# Patient Record
(demographics unavailable — no encounter records)

---

## 2019-01-31 NOTE — RADIOLOGY REPORT (SQ)
EXAM DESCRIPTION:  LUMBAR PUNCTURE; FLUORO/NEEDLE PLACEMENT/SPINE



COMPLETED DATE/TIME:  1/31/2019 11:08 am



REASON FOR STUDY:  MULTIPLE SCLEROSIS



COMPARISON:  None.



FLUOROSCOPY TIME:  9 seconds

1 digital fluoroscopic images saved to PACS.



TECHNIQUE:  Fluoroscopic guided lumbar puncture.



LIMITATIONS:  None.



PROCEDURE:  After written consent and assessment were obtained, the patient was brought into the fluo
roscopy room and placed prone on the table.  The patient's lower back was prepped in a sterile fashio
n and an entry site was selected under live fluoroscopic guidance, right paracentral L2-3 level. The 
entry site was anesthetized with 4 mL of 1% lidocaine. A 22 gauge needle was advanced through the ski
n and into the thecal sac at the right paracentral L2-3 level After approximately 8.5 ml was drained,
 the needle was removed and a sterile bandage was placed of the site.  Specimens were sent to the lab
 for testing.  A fluoroscopic spot image was saved to PACS confirming level access.



FINDINGS:  Clear CSF, opening pressure 23 cm of water.



IMPRESSION:  Lumbar puncture under fluoroscopy. No immediate complication.



COMMENT:  Patient medication list reviewed: Yes- Quality ID# [id number]:Eligible professional attests to doc
umenting in the medical record they obtained, updated, or reviewed the patient's current medications.
.

Quality :  Final reports for procedures using fluoroscopy that document radiation exposure jose d
mario, or exposure time and number of fluorographic images (if radiation exposure indices are not avail
able)



TECHNICAL DOCUMENTATION:  JOB ID:  [id number]

 2011 [employer]- All Rights Reserved



Reading location - IP/workstation name: [email or web address]

## 2019-01-31 NOTE — RADIOLOGY REPORT (SQ)
EXAM DESCRIPTION:  LUMBAR PUNCTURE; FLUORO/NEEDLE PLACEMENT/SPINE



COMPLETED DATE/TIME:  1/31/2019 11:08 am



REASON FOR STUDY:  MULTIPLE SCLEROSIS



COMPARISON:  None.



FLUOROSCOPY TIME:  9 seconds

1 digital fluoroscopic images saved to PACS.



TECHNIQUE:  Fluoroscopic guided lumbar puncture.



LIMITATIONS:  None.



PROCEDURE:  After written consent and assessment were obtained, the patient was brought into the fluo
roscopy room and placed prone on the table.  The patient's lower back was prepped in a sterile fashio
n and an entry site was selected under live fluoroscopic guidance, right paracentral L2-3 level. The 
entry site was anesthetized with 4 mL of 1% lidocaine. A 22 gauge needle was advanced through the ski
n and into the thecal sac at the right paracentral L2-3 level After approximately 8.5 ml was drained,
 the needle was removed and a sterile bandage was placed of the site.  Specimens were sent to the lab
 for testing.  A fluoroscopic spot image was saved to PACS confirming level access.



FINDINGS:  Clear CSF, opening pressure 23 cm of water.



IMPRESSION:  Lumbar puncture under fluoroscopy. No immediate complication.



COMMENT:  Patient medication list reviewed: Yes- Quality ID# [id number]:Eligible professional attests to doc
umenting in the medical record they obtained, updated, or reviewed the patient's current medications.
.

Quality :  Final reports for procedures using fluoroscopy that document radiation exposure jose d
mario, or exposure time and number of fluorographic images (if radiation exposure indices are not avail
able)



TECHNICAL DOCUMENTATION:  JOB ID:  [id number]

 2011 [employer]- All Rights Reserved



Reading location - IP/workstation name: [email or web address]

## 2019-02-01 NOTE — ER DOCUMENT REPORT
ED General





- General


Chief Complaint: Headache


Stated Complaint: HEADACHE


Time Seen by Provider: 02/01/19 16:39


Primary Care Provider: 


BAHMAN RIVAS MD [Primary Care Provider] - Follow up as needed


Mode of Arrival: Ambulatory


Notes: 





Patient is a 21-year-old female without chronic medical problems, had a 

diagnostic lumbar puncture performed yesterday to evaluate for possible MS.  

States that several hours after receiving lumbar puncture she developed a 

global, throbbing, constant headache that has been ongoing since that time.  

Patient states that the headache is associated with photophobia as well as 

nausea but no vomiting.  She tried her triptan migraine medication at home 

without relief.  States that standing or exerting herself worsens the head.  

Does have history of migraine headaches but states this does not feel like her 

typical migraine headache.  Contacted the clinic that performed a lumbar 

puncture, was instructed to come to the emergency department.  Denies any focal 

weakness, numbness, fever or confusion.  Contrary to triage assessment patient 

denies any significant pain to the lumbar puncture site.


TRAVEL OUTSIDE OF THE [place] IN LAST 30 DAYS: No





- Related Data


Allergies/Adverse Reactions: 


                                        





No Known Allergies Allergy (Unverified 02/01/19 15:56)


   











Past Medical History





- General


Information source: Patient





- Social History


Smoking Status: Current Every Day Smoker


Frequency of alcohol use: Occasional


Drug Abuse: None


Lives with: Spouse/Significant other


Family History: Reviewed & Not Pertinent


Patient has suicidal ideation: No


Patient has homicidal ideation: No





- Past Medical History


Cardiac Medical History: 


   Denies: Hx Coronary Artery Disease, Hx Heart Attack, Hx Hypertension


Pulmonary Medical History: Reports: Hx Asthma, Hx Pneumonia


   Denies: Hx Bronchitis, Hx COPD


Neurological Medical History: Reports: Hx Seizures - As a child.  Denies: Hx 

Cerebrovascular Accident


Renal/ Medical History: Denies: Hx Peritoneal Dialysis


Musculoskeletal Medical History: Denies Hx Arthritis


Past Surgical History: Reports: Hx Tonsillectomy





- Immunizations


Hx Diphtheria, Pertussis, Tetanus Vaccination: Yes





Review of Systems





- Review of Systems


Notes: 





Constitutional: Negative for fever.


HENT: Negative for sore throat.


Eyes: Negative for visual changes.


Cardiovascular: Negative for chest pain.


Respiratory: Negative for shortness of breath.


Gastrointestinal: Negative for abdominal pain, vomiting or diarrhea.


Genitourinary: Negative for dysuria.


Musculoskeletal: Negative for back pain.


Skin: Negative for rash.


Neurological: Positive for headache


10 point ROS negative except as marked above and in HPI.





Physical Exam





- Vital signs


Vitals: 


                                        











Temp Pulse Resp BP Pulse Ox


 


 98.9 F   97   16   114/83   96 


 


 02/01/19 16:09  02/01/19 16:09  02/01/19 16:09  02/01/19 16:09  02/01/19 16:09











Interpretation: Normal


Notes: 





PHYSICAL EXAMINATION:





GENERAL: Well-appearing, well-nourished and in no acute distress.





HEAD: Atraumatic, normocephalic.





EYES: Pupils equal round and reactive to light, extraocular movements intact, 

sclera anicteric, conjunctiva are normal.





ENT: nares patent, oropharynx clear without exudates.  Moist mucous membranes.





NECK: Normal range of motion, supple without lymphadenopathy





LUNGS: Breath sounds clear to auscultation bilaterally and equal.  No wheezes 

rales or rhonchi.





HEART: Regular rate and rhythm without murmurs





ABDOMEN: Soft, nontender, normoactive bowel sounds.  No guarding, no rebound.  

No masses appreciated.





EXTREMITIES: Normal range of motion, no pitting or edema.  No cyanosis.





NEUROLOGICAL: Face symmetric.  Tongue protrudes midline.  Extraocular motions 

intact.  Pupils are 2 mm and equally reactive.  Normal speech, normal gait.  5 

out of 5 strength in both the distal and proximal upper and lower extremities 

bilaterally.  Sensation is grossly intact throughout.  Finger to nose testing 

normal.  Pronator drift normal.


PSYCH: Normal mood, normal affect.





SKIN: Warm, Dry, normal turgor, no rashes or lesions noted.





Course





- Re-evaluation


Re-evalutation: 





02/01/19 19:02


Patient presents with signs and symptoms most consistent with a post lumbar 

puncture headache.  Headache is positional in nature, global, and has started 

since the patient had a lumbar puncture yesterday to definitively exclude 

multiple sclerosis.  LP site appears well, no evidence of subcutaneous hematoma.

 A full neurologic exam is completely unremarkable.  Labs obtained in triage 

reviewed, not pertinent to presentation.  I have discussed with the patient that

the only curative measure in this context would be a blood patch.  We have 

discussed an alternative course of watchful waiting as these lumbar puncture 

headaches do typically resolve within the next 1 week.  Patient did elect to go 

home, begin taking caffeine, plenty of fluids and follow-up with her 

neurologist.  She understands that the headache could take an additional 7 days 

to go away.  She has declined setting up an appointment with anesthesiology for 

a blood patch tomorrow.  Clinical history and exam are not consistent with 

meningitis, encephalitis, subarachnoid hemorrhage, intercranial mass or any 

alternative worrisome diagnosis.  At this time will discharge with return 

precautions and follow-up recommendations.  Verbal discharge instructions given 

a the bedside and opportunity for questions given. Medication warnings reviewed.

Patient is in agreement with this plan and has verbalized understanding of 

return precautions and the need for primary care follow-up in the next 24-72 

hours.





- Vital Signs


Vital signs: 


                                        











Temp Pulse Resp BP Pulse Ox


 


 97.9 F   71   17   118/72   100 


 


 02/01/19 19:34  02/01/19 19:34  02/01/19 19:34  02/01/19 19:34  02/01/19 19:34














- Laboratory


Result Diagrams: 


                                 02/01/19 17:00





                                 02/01/19 17:00


Laboratory results interpreted by me: 


                                        











  02/01/19 02/01/19





  17:00 17:24


 


Hgb  15.8 H 


 


Ur Leukocyte Esterase   TRACE H














Discharge





- Discharge


Clinical Impression: 


 Post lumbar puncture headache





Condition: Good


Disposition: HOME, SELF-CARE


Additional Instructions: 


You have been seen in the Emergency Department (ED) for a headache.  Please use 

Tylenol (acetaminophen) or Motrin (ibuprofen) as needed for symptoms, but only 

as written on the box.  You should also take caffeine pills, 200 mg every 6 

hours and drink plenty of fluids.  You should try laying down as much as poss

ible which can help reduce your pain.  Your headache is likely due to the lumbar

puncture.  The formal diagnosis is "post lumbar puncture headache".  As we 

discussed there is no medications to resolve this, only a procedural option of a

blood patch.  You have declined setting up an appointment for this procedure.  

Your headache may last up to an additional 7 days.





As we have discussed, please follow up with your primary care doctor as soon as 

possible regarding today's ED visit and your headache symptoms.  





Call your doctor or return to the ED if you have a worsening headache, fever 

greater than 100.4 F, sudden and severe headache, confusion, slurred speech, 

facial droop, weakness or numbness in any arm or leg, extreme fatigue, or other 

symptoms that concern you.


Referrals: 


BAHMAN RIVAS MD [Primary Care Provider] - Follow up as needed

## 2019-02-01 NOTE — ER DOCUMENT REPORT
ED Medical Screen (RME)





- General


Chief Complaint: Headache


Stated Complaint: HEADACHE


Time Seen by Provider: 02/01/19 16:39


Primary Care Provider: 


BAHMAN RIVAS MD [Primary Care Provider] - Follow up as needed


Mode of Arrival: Ambulatory


Information source: Patient


Notes: 





21-year-old female presents to ED for complaint of back pain.  She states she 

[facility] and the pain and 

headache have become much worse.  She states the pain is shooting down her 

pelvis and hips.  She states that the [facility] told yesterday to return

if symptoms were not relieved within 24 hours.  She states they called her today

and when she described her symptoms they told her to come to the emergency room 

now.  Patient is here for increase in pain.  I have ordered blood work labs and 

for patient to be placed in a bed laying flat.  She will be evaluated by another

provider.  Consultant Dr. Ortega he states the main thing was to get the labs 

and get IV fluids going and have her seen.








I have greeted and performed a rapid initial assessment of this patient.  A 

comprehensive ED assessment and evaluation of the patient, analysis of test 

results and completion of medical decision making process will be conducted by 

an additional ED providers.


TRAVEL OUTSIDE OF THE [place] IN LAST 30 DAYS: No





- Related Data


Allergies/Adverse Reactions: 


                                        





No Known Allergies Allergy (Unverified 02/01/19 15:56)


   











Past Medical History





- Past Medical History


Cardiac Medical History: 


   Denies: Hx Coronary Artery Disease, Hx Heart Attack, Hx Hypertension


Pulmonary Medical History: Reports: Hx Asthma, Hx Pneumonia


   Denies: Hx Bronchitis, Hx COPD


Neurological Medical History: Reports: Hx Seizures - As a child.  Denies: Hx 

Cerebrovascular Accident


Musculoskeltal Medical History: Denies Hx Arthritis





- Immunizations


Hx Diphtheria, Pertussis, Tetanus Vaccination: Yes


History of Influenza Vaccine for 10/2017 - 3/2018 Season: Yes


Influenza Administration Date for 10/2017 - 3/2018 Season: 12/01/18





Physical Exam





- Vital signs


Vitals: 





                                        











Temp Pulse Resp BP Pulse Ox


 


 98.9 F   97   16   114/83   96 


 


 02/01/19 16:09  02/01/19 16:09  02/01/19 16:09  02/01/19 16:09  02/01/19 16:09














Course





- Vital Signs


Vital signs: 





                                        











Temp Pulse Resp BP Pulse Ox


 


 98.9 F   97   16   114/83   96 


 


 02/01/19 16:09  02/01/19 16:09  02/01/19 16:09  02/01/19 16:09  02/01/19 16:09














Doctor's Discharge





- Discharge


Referrals: 


BAHMAN RIVAS MD [Primary Care Provider] - Follow up as needed